# Patient Record
Sex: FEMALE
[De-identification: names, ages, dates, MRNs, and addresses within clinical notes are randomized per-mention and may not be internally consistent; named-entity substitution may affect disease eponyms.]

---

## 2020-11-11 ENCOUNTER — NON-APPOINTMENT (OUTPATIENT)
Age: 45
End: 2020-11-11

## 2020-11-12 ENCOUNTER — NON-APPOINTMENT (OUTPATIENT)
Age: 45
End: 2020-11-12

## 2020-11-16 ENCOUNTER — NON-APPOINTMENT (OUTPATIENT)
Age: 45
End: 2020-11-16

## 2021-01-06 PROBLEM — Z00.00 ENCOUNTER FOR PREVENTIVE HEALTH EXAMINATION: Noted: 2021-01-06

## 2021-01-13 ENCOUNTER — OUTPATIENT (OUTPATIENT)
Dept: OUTPATIENT SERVICES | Facility: HOSPITAL | Age: 46
LOS: 1 days | Discharge: HOME | End: 2021-01-13

## 2021-01-13 ENCOUNTER — APPOINTMENT (OUTPATIENT)
Dept: SPEECH THERAPY | Facility: CLINIC | Age: 46
End: 2021-01-13

## 2021-03-02 DIAGNOSIS — R49.0 DYSPHONIA: ICD-10-CM

## 2021-03-04 ENCOUNTER — NON-APPOINTMENT (OUTPATIENT)
Age: 46
End: 2021-03-04

## 2021-03-10 ENCOUNTER — APPOINTMENT (OUTPATIENT)
Dept: SPEECH THERAPY | Facility: CLINIC | Age: 46
End: 2021-03-10

## 2021-03-17 ENCOUNTER — APPOINTMENT (OUTPATIENT)
Dept: SPEECH THERAPY | Facility: CLINIC | Age: 46
End: 2021-03-17

## 2021-03-24 ENCOUNTER — APPOINTMENT (OUTPATIENT)
Dept: SPEECH THERAPY | Facility: CLINIC | Age: 46
End: 2021-03-24

## 2021-03-31 ENCOUNTER — APPOINTMENT (OUTPATIENT)
Dept: CT IMAGING | Facility: CLINIC | Age: 46
End: 2021-03-31
Payer: MEDICAID

## 2021-03-31 ENCOUNTER — OUTPATIENT (OUTPATIENT)
Dept: OUTPATIENT SERVICES | Facility: HOSPITAL | Age: 46
LOS: 1 days | End: 2021-03-31
Payer: MEDICAID

## 2021-03-31 ENCOUNTER — APPOINTMENT (OUTPATIENT)
Dept: OTOLARYNGOLOGY | Facility: CLINIC | Age: 46
End: 2021-03-31
Payer: MEDICAID

## 2021-03-31 ENCOUNTER — OUTPATIENT (OUTPATIENT)
Dept: OUTPATIENT SERVICES | Facility: HOSPITAL | Age: 46
LOS: 1 days | Discharge: HOME | End: 2021-03-31

## 2021-03-31 DIAGNOSIS — J32.0 CHRONIC MAXILLARY SINUSITIS: ICD-10-CM

## 2021-03-31 DIAGNOSIS — R09.81 NASAL CONGESTION: ICD-10-CM

## 2021-03-31 DIAGNOSIS — Z78.9 OTHER SPECIFIED HEALTH STATUS: ICD-10-CM

## 2021-03-31 DIAGNOSIS — Z00.8 ENCOUNTER FOR OTHER GENERAL EXAMINATION: ICD-10-CM

## 2021-03-31 DIAGNOSIS — Z83.3 FAMILY HISTORY OF DIABETES MELLITUS: ICD-10-CM

## 2021-03-31 DIAGNOSIS — Z80.0 FAMILY HISTORY OF MALIGNANT NEOPLASM OF DIGESTIVE ORGANS: ICD-10-CM

## 2021-03-31 PROCEDURE — 70486 CT MAXILLOFACIAL W/O DYE: CPT

## 2021-03-31 PROCEDURE — 99204 OFFICE O/P NEW MOD 45 MIN: CPT | Mod: 25

## 2021-03-31 PROCEDURE — 70486 CT MAXILLOFACIAL W/O DYE: CPT | Mod: 26

## 2021-03-31 PROCEDURE — 99072 ADDL SUPL MATRL&STAF TM PHE: CPT

## 2021-03-31 PROCEDURE — 31579 LARYNGOSCOPY TELESCOPIC: CPT

## 2021-03-31 RX ORDER — ATORVASTATIN CALCIUM 80 MG/1
TABLET, FILM COATED ORAL
Refills: 0 | Status: ACTIVE | COMMUNITY

## 2021-03-31 RX ORDER — MULTIVITAMIN
TABLET ORAL
Refills: 0 | Status: ACTIVE | COMMUNITY

## 2021-03-31 RX ORDER — FAMOTIDINE 20 MG/1
20 TABLET, FILM COATED ORAL
Qty: 180 | Refills: 2 | Status: ACTIVE | COMMUNITY
Start: 2021-03-31 | End: 1900-01-01

## 2021-03-31 RX ORDER — AZELASTINE HYDROCHLORIDE 137 UG/1
0.1 SPRAY, METERED NASAL TWICE DAILY
Qty: 1 | Refills: 1 | Status: ACTIVE | COMMUNITY
Start: 2021-03-31 | End: 1900-01-01

## 2021-03-31 NOTE — PHYSICAL EXAM
[Midline] : trachea located in midline position [Laryngoscopy Performed] : laryngoscopy was performed, see procedure section for findings [Normal] : no rashes [de-identified] : moderately breathy and asthenic voice

## 2021-03-31 NOTE — CONSULT LETTER
[Consult Letter:] : I had the pleasure of evaluating your patient, [unfilled]. [Please see my note below.] : Please see my note below. [Consult Closing:] : Thank you very much for allowing me to participate in the care of this patient.  If you have any questions, please do not hesitate to contact me. [Sincerely,] : Sincerely, [FreeTextEntry2] : Sofie Gomez [FreeTextEntry3] : Harsha Ivey MD, PhD\par Chief, Division of Laryngology\par Department of Otolaryngology\par [facility]\par Pediatric Otolaryngology, [facility]\par  of Otolaryngology\par [facility] [employer]\par

## 2021-03-31 NOTE — HISTORY OF PRESENT ILLNESS
[de-identified] : 46 year female referred by Iraida Gomez, SLP for dysphonia for 3 years. Had previous vocal fold polyps removed with surgery 3 years ago in [place], then had hysterectomy surgery and voice got much worse. Mild significant impact from VT, still going and motivated. No SOB, no hemoptysis, denies solid dysphagia, no aspiration symptoms. Some globus sensation, frequent heartburn. More talking or raising voice leads to voice cutting out. Does lose completely. +h/o gastritis, had taken famotidine but not now.

## 2021-03-31 NOTE — REASON FOR VISIT
[Initial Consultation] : an initial consultation for [ Service] : provided by  Service [FreeTextEntry1] : [id number] [FreeTextEntry2] : Juanita [TWNoteComboBox1] : [place]

## 2021-04-13 DIAGNOSIS — R49.0 DYSPHONIA: ICD-10-CM

## 2021-04-13 DIAGNOSIS — R09.81 NASAL CONGESTION: ICD-10-CM

## 2021-04-13 DIAGNOSIS — J32.9 CHRONIC SINUSITIS, UNSPECIFIED: ICD-10-CM

## 2021-04-13 DIAGNOSIS — J38.3 OTHER DISEASES OF VOCAL CORDS: ICD-10-CM

## 2021-04-13 DIAGNOSIS — J38.1 POLYP OF VOCAL CORD AND LARYNX: ICD-10-CM

## 2021-04-13 DIAGNOSIS — J32.0 CHRONIC MAXILLARY SINUSITIS: ICD-10-CM

## 2021-04-13 DIAGNOSIS — K21.9 GASTRO-ESOPHAGEAL REFLUX DISEASE WITHOUT ESOPHAGITIS: ICD-10-CM

## 2021-04-14 DIAGNOSIS — J38.3 OTHER DISEASES OF VOCAL CORDS: ICD-10-CM

## 2021-04-15 ENCOUNTER — NON-APPOINTMENT (OUTPATIENT)
Age: 46
End: 2021-04-15

## 2021-07-07 ENCOUNTER — APPOINTMENT (OUTPATIENT)
Dept: OTOLARYNGOLOGY | Facility: CLINIC | Age: 46
End: 2021-07-07

## 2022-01-10 ENCOUNTER — APPOINTMENT (OUTPATIENT)
Dept: ORTHOPEDIC SURGERY | Facility: CLINIC | Age: 47
End: 2022-01-10
Payer: MEDICAID

## 2022-01-10 DIAGNOSIS — M47.26 OTHER SPONDYLOSIS WITH RADICULOPATHY, LUMBAR REGION: ICD-10-CM

## 2022-01-10 PROCEDURE — 99204 OFFICE O/P NEW MOD 45 MIN: CPT

## 2022-01-10 NOTE — DISCUSSION/SUMMARY
[de-identified] : Discussed findings of today's exam and possible causes of patient's pain.  Educated patient on their most probable diagnosis of chronic intermittent low back pain with recent atraumatic exacerbation due to underlying osteoarthritis with right lumbar radiculopathy.  Reviewed possible courses of treatment, and we collaboratively decided best course of treatment at this time will include conservative management.  Patient started on a course of oral NSAIDs, prescription given for Mobic (We discussed all possible side effects of this medication).  Patient will be started on a course of physical therapy to restore normal range of motion and strength as tolerated.  If patient has persisting pain would recommend advanced imaging with MRI at that time to evaluate for right-sided disc herniation and subsequent physiatry referral for consultation regarding RAQUEL.  Follow up as needed.  Patient and her 17-year-old son (acted as  when needed) appreciate and agree with current plan.\par \par I work as part of an academic orthopedic group and routinely have a physician in training (resident / fellow) working with me.  Any part of the history and physical exam performed by the physician in training was either directly reviewed and/or replicated by myself.  Any procedure performed by the physician in training was performed under my direct supervision and with the consent of the patient.\par \par This note was generated using dragon medical dictation software.  A reasonable effort has been made for proofreading its contents, but typos may still remain.  If there are any questions or points of clarification needed please notify my office.

## 2022-01-10 NOTE — PHYSICAL EXAM
[de-identified] : Constitutional: Well-nourished, well-developed, No acute distress\par Respiratory:  Good respiratory effort, no SOB\par Lymphatic: No regional lymphadenopathy, no lymphedema\par Psychiatric: Pleasant and normal affect, alert and oriented x3\par Skin: Clean dry and intact B/L LE\par Musculoskeletal: normal except where as noted in regional exam\par \par \par Lumbar Spine Exam\par APPEARANCE: no marked deformities or malalignment, normal curvature of the lumbosacral spine. good posture.\par POSITIVE TENDERNESS: + Right > left lower lumbar paraspinal muscles\par NONTENDER: no bony midline tenderness\par ROM: Limited flexion due to stiffness and pain, mildly limited SB/Rot\par RESISTIVE TESTING: + pain 4/5 resisted flex/ext, sidebending b/l, and rotation\par SPECIAL TESTS: + Right SLR and SID, neg Trendelenburg b/l \par PULSES: 2+ DP/PT pulses\par NEURO:   L1 - S2 intact to sensation and motor, DTRs 2+/4 patella and achilles\par  [de-identified] : I reviewed, interpreted and clinically correlated the following outside imaging studies,\par \par Outpatient x-rays, 2 views of the lumbar spine, evidence of mild to moderate multilevel osteoarthritis, most notably with moderate osteoarthritis at L4-L5 and L5-S1, no significant malalignment\par \par ___________\par \par Date of Exam: 12-\par  \par EXAM:  X-RAY LUMBAR SPINE 2 OR 3 VIEWS\par \par HISTORY:  Chronic pain. \par \par TECHNIQUE: 2 views.\par \par COMPARISON:  MRI 2020\par \par IMPRESSION: Mild to moderate L4-5 disc space narrowing, no change.\par \par L4 and L5 anterior endplate degeneration.\par \par Otherwise, normal spine: intact AP alignment, sacrum,  intact disc spaces, lordosis, and grossly intact SI joints;Intact medial hip joint spaces.

## 2022-01-10 NOTE — HISTORY OF PRESENT ILLNESS
[de-identified] : Patient is here for LBP (R > L) for the past year. Denies any specific injury or trauma. Reports that over 5 years ago she fell downstairs, didn't have pain at the time however last year LBP started gradually. Has been taking Tylenol as needed for pain with mild improvement. Reports that when she has pain it typically radiates down into R groin and anterior thigh. Denies any numbness, tingling or weakness. She works as a , job requires standing mostly. PCP- Dr. Marilin Yanes. Had a recent lower back x-raywhich showed mild to moderate L4-L5 disc narrowing. \par \par The patient's past medical history, past surgical history, medications and allergies were reviewed by me today and documented accordingly. In addition, the patient's family and social history, which were noncontributory to this visit, were reviewed also. Intake form was reviewed. The patient has no family history of arthritis.

## 2022-01-10 NOTE — PHYSICAL EXAM
[de-identified] : Constitutional: Well-nourished, well-developed, No acute distress\par Respiratory:  Good respiratory effort, no SOB\par Lymphatic: No regional lymphadenopathy, no lymphedema\par Psychiatric: Pleasant and normal affect, alert and oriented x3\par Skin: Clean dry and intact B/L LE\par Musculoskeletal: normal except where as noted in regional exam\par \par \par Lumbar Spine Exam\par APPEARANCE: no marked deformities or malalignment, normal curvature of the lumbosacral spine. good posture.\par POSITIVE TENDERNESS: + Right > left lower lumbar paraspinal muscles\par NONTENDER: no bony midline tenderness\par ROM: Limited flexion due to stiffness and pain, mildly limited SB/Rot\par RESISTIVE TESTING: + pain 4/5 resisted flex/ext, sidebending b/l, and rotation\par SPECIAL TESTS: + Right SLR and SID, neg Trendelenburg b/l \par PULSES: 2+ DP/PT pulses\par NEURO:   L1 - S2 intact to sensation and motor, DTRs 2+/4 patella and achilles\par  [de-identified] : I reviewed, interpreted and clinically correlated the following outside imaging studies,\par \par Outpatient x-rays, 2 views of the lumbar spine, evidence of mild to moderate multilevel osteoarthritis, most notably with moderate osteoarthritis at L4-L5 and L5-S1, no significant malalignment\par \par ___________\par \par Date of Exam: 12-\par  \par EXAM:  X-RAY LUMBAR SPINE 2 OR 3 VIEWS\par \par HISTORY:  Chronic pain. \par \par TECHNIQUE: 2 views.\par \par COMPARISON:  MRI 2020\par \par IMPRESSION: Mild to moderate L4-5 disc space narrowing, no change.\par \par L4 and L5 anterior endplate degeneration.\par \par Otherwise, normal spine: intact AP alignment, sacrum,  intact disc spaces, lordosis, and grossly intact SI joints;Intact medial hip joint spaces.

## 2022-01-10 NOTE — CONSULT LETTER
[Dear  ___] : Dear ~SHEYLA, [Consult Letter:] : I had the pleasure of evaluating your patient, [unfilled]. [Please see my note below.] : Please see my note below. [Consult Closing:] : Thank you very much for allowing me to participate in the care of this patient.  If you have any questions, please do not hesitate to contact me. [Sincerely,] : Sincerely, [FreeTextEntry2] : PCP- Dr. Marilin Yanes\par [address] [facility]\par [facility] [id number] [FreeTextEntry3] : Wicho Toure DO, ATC\par Primary Care Sports Medicine\par [facility] [place]

## 2022-01-10 NOTE — DISCUSSION/SUMMARY
[de-identified] : Discussed findings of today's exam and possible causes of patient's pain.  Educated patient on their most probable diagnosis of chronic intermittent low back pain with recent atraumatic exacerbation due to underlying osteoarthritis with right lumbar radiculopathy.  Reviewed possible courses of treatment, and we collaboratively decided best course of treatment at this time will include conservative management.  Patient started on a course of oral NSAIDs, prescription given for Mobic (We discussed all possible side effects of this medication).  Patient will be started on a course of physical therapy to restore normal range of motion and strength as tolerated.  If patient has persisting pain would recommend advanced imaging with MRI at that time to evaluate for right-sided disc herniation and subsequent physiatry referral for consultation regarding RAQUEL.  Follow up as needed.  Patient and her 17-year-old son (acted as  when needed) appreciate and agree with current plan.\par \par I work as part of an academic orthopedic group and routinely have a physician in training (resident / fellow) working with me.  Any part of the history and physical exam performed by the physician in training was either directly reviewed and/or replicated by myself.  Any procedure performed by the physician in training was performed under my direct supervision and with the consent of the patient.\par \par This note was generated using dragon medical dictation software.  A reasonable effort has been made for proofreading its contents, but typos may still remain.  If there are any questions or points of clarification needed please notify my office.

## 2022-01-10 NOTE — CONSULT LETTER
[Dear  ___] : Dear ~SHEYLA, [Consult Letter:] : I had the pleasure of evaluating your patient, [unfilled]. [Please see my note below.] : Please see my note below. [Consult Closing:] : Thank you very much for allowing me to participate in the care of this patient.  If you have any questions, please do not hesitate to contact me. [Sincerely,] : Sincerely, [FreeTextEntry2] : PCP- Dr. Marilin Yanes\par [address] [facility]\par [facility] [address] [FreeTextEntry3] : Wicho Toure DO, ATC\par Primary Care Sports Medicine\par [facility] [place]

## 2022-01-10 NOTE — HISTORY OF PRESENT ILLNESS
[de-identified] : Patient is here for LBP (R > L) for the past year. Denies any specific injury or trauma. Reports that over 5 years ago she fell downstairs, didn't have pain at the time however last year LBP started gradually. Has been taking Tylenol as needed for pain with mild improvement. Reports that when she has pain it typically radiates down into R groin and anterior thigh. Denies any numbness, tingling or weakness. She works as a , job requires standing mostly. PCP- Dr. Marilin Yanes. Had a recent lower back x-raywhich showed mild to moderate L4-L5 disc narrowing. \par \par The patient's past medical history, past surgical history, medications and allergies were reviewed by me today and documented accordingly. In addition, the patient's family and social history, which were noncontributory to this visit, were reviewed also. Intake form was reviewed. The patient has no family history of arthritis.

## 2022-01-19 RX ORDER — ACETAMINOPHEN EXTRA STRENGTH 500 MG/1
500 TABLET ORAL
Qty: 180 | Refills: 0 | Status: ACTIVE | COMMUNITY
Start: 2022-01-19 | End: 1900-01-01

## 2025-04-08 ENCOUNTER — APPOINTMENT (OUTPATIENT)
Dept: ORTHOPEDIC SURGERY | Facility: CLINIC | Age: 50
End: 2025-04-08